# Patient Record
Sex: MALE | Race: BLACK OR AFRICAN AMERICAN | NOT HISPANIC OR LATINO | URBAN - METROPOLITAN AREA
[De-identification: names, ages, dates, MRNs, and addresses within clinical notes are randomized per-mention and may not be internally consistent; named-entity substitution may affect disease eponyms.]

---

## 2018-06-05 ENCOUNTER — EMERGENCY (EMERGENCY)
Facility: HOSPITAL | Age: 28
LOS: 1 days | Discharge: HOME | End: 2018-06-05
Attending: EMERGENCY MEDICINE
Payer: SELF-PAY

## 2018-06-05 VITALS
HEIGHT: 70 IN | RESPIRATION RATE: 16 BRPM | DIASTOLIC BLOOD PRESSURE: 74 MMHG | OXYGEN SATURATION: 99 % | HEART RATE: 90 BPM | SYSTOLIC BLOOD PRESSURE: 124 MMHG | TEMPERATURE: 98 F | WEIGHT: 220.02 LBS

## 2018-06-05 PROCEDURE — 99053 MED SERV 10PM-8AM 24 HR FAC: CPT

## 2018-06-05 PROCEDURE — 99284 EMERGENCY DEPT VISIT MOD MDM: CPT | Mod: 25

## 2018-06-05 PROCEDURE — 73130 X-RAY EXAM OF HAND: CPT | Mod: 26,LT

## 2018-06-05 PROCEDURE — 29125 APPL SHORT ARM SPLINT STATIC: CPT

## 2018-06-05 NOTE — ED ADULT TRIAGE NOTE - CHIEF COMPLAINT QUOTE
pt sent from Urgent Care for Left hand Fracture, pt has copy of xray disc, swelling noted on left hand

## 2018-06-05 NOTE — ED PROVIDER NOTE - CARE PLAN
Principal Discharge DX:	Closed displaced fracture of shaft of fourth metacarpal bone of left hand, initial encounter

## 2018-06-05 NOTE — ED PROVIDER NOTE - OBJECTIVE STATEMENT
29 y/o M pt w/ no significant PMHx comes in from Urgent Care w/ fracture in the L 4th digit. Pt states that he was running up the stairs at his job when his hand slammed against a metal pole on a staircase. Pt states that he went to Urgent Care to get X-ray and was told that he had a fracture. Because incident happened at work, pt was told to come to the ED for evaluation. NKDA.

## 2018-06-06 VITALS
TEMPERATURE: 98 F | DIASTOLIC BLOOD PRESSURE: 67 MMHG | OXYGEN SATURATION: 98 % | RESPIRATION RATE: 16 BRPM | SYSTOLIC BLOOD PRESSURE: 112 MMHG | HEART RATE: 81 BPM

## 2018-06-06 PROCEDURE — 73130 X-RAY EXAM OF HAND: CPT

## 2018-06-06 PROCEDURE — 73120 X-RAY EXAM OF HAND: CPT | Mod: 26,LT

## 2018-06-06 PROCEDURE — 29125 APPL SHORT ARM SPLINT STATIC: CPT | Mod: LT

## 2018-06-06 PROCEDURE — 99283 EMERGENCY DEPT VISIT LOW MDM: CPT | Mod: 25

## 2018-06-06 PROCEDURE — 73120 X-RAY EXAM OF HAND: CPT

## 2018-06-06 RX ORDER — IBUPROFEN 200 MG
600 TABLET ORAL ONCE
Qty: 0 | Refills: 0 | Status: COMPLETED | OUTPATIENT
Start: 2018-06-06 | End: 2018-06-06

## 2018-06-06 RX ORDER — IBUPROFEN 200 MG
1 TABLET ORAL
Qty: 20 | Refills: 0 | OUTPATIENT
Start: 2018-06-06

## 2018-06-06 RX ADMIN — Medication 600 MILLIGRAM(S): at 02:14

## 2018-06-06 RX ADMIN — Medication 600 MILLIGRAM(S): at 00:58

## 2021-04-29 NOTE — ED ADULT NURSE NOTE - PT NEEDS ASSIST
S/p MRCP  Etiology of abd pain and transaminitis  Given size of stone recommended per GI for transfer to [facility] for ERCP  Pt empirically started on abx with ancef/flagyl  No sign of acute infxn process  Cont supportive care   to undergo ERCP today; await further GI recommendations  Recurring hx of cholelithiasis s/p cholecystectomy    Educated on lifestyle dietary modification   LFTs improving on morning labs; continue to monitor no